# Patient Record
Sex: MALE | Race: WHITE
[De-identification: names, ages, dates, MRNs, and addresses within clinical notes are randomized per-mention and may not be internally consistent; named-entity substitution may affect disease eponyms.]

---

## 2017-05-05 ENCOUNTER — HOSPITAL ENCOUNTER (OUTPATIENT)
Dept: HOSPITAL 45 - C.MRIBC | Age: 57
Discharge: HOME | End: 2017-05-05
Attending: PSYCHIATRY & NEUROLOGY
Payer: COMMERCIAL

## 2017-05-05 DIAGNOSIS — Z87.898: ICD-10-CM

## 2017-05-05 DIAGNOSIS — R41.3: Primary | ICD-10-CM

## 2017-05-05 DIAGNOSIS — R90.89: ICD-10-CM

## 2017-05-05 NOTE — DIAGNOSTIC IMAGING REPORT
Brain MRI WITHOUT CONTRAST



HISTORY: Mental status change  MEMORY Loss, hx OF HEADACHES



TECHNIQUE: Multiplanar multisequence MRI of the brain was performed without the

use of contrast.



COMPARISON STUDY:  None.



FINDINGS: There are no areas of restricted diffusion to suggest acute

infarction. The midline structures are intact. The paranasal sinuses are clear.

The mastoid air cells are clear. The ventricles and sulci are within normal

limits for age. There is no mass, hematoma, midline shift. The major vascular

flow-voids at the skull base are well maintained. Several small foci of

increased signal are present within the periventricular deep white matter

regions.



IMPRESSION:  

Several small foci of increased signal within the periventricular deep white

matter regions. This is consistent with that of mild chronic small vessel change

considered unremarkable for age. No acute ischemic insult. 







Electronically signed by:  Good [place]iz M.D.

5/5/2017 11:41 AM



Dictated Date/Time:  5/5/2017 11:23 AM

## 2018-01-10 ENCOUNTER — HOSPITAL ENCOUNTER (OUTPATIENT)
Dept: HOSPITAL 45 - C.LAB1850 | Age: 58
Discharge: HOME | End: 2018-01-10
Attending: INTERNAL MEDICINE
Payer: COMMERCIAL

## 2018-01-10 DIAGNOSIS — E11.42: ICD-10-CM

## 2018-01-10 DIAGNOSIS — K76.0: ICD-10-CM

## 2018-01-10 DIAGNOSIS — Z12.5: ICD-10-CM

## 2018-01-10 DIAGNOSIS — Z00.00: Primary | ICD-10-CM

## 2018-01-10 DIAGNOSIS — E78.5: ICD-10-CM

## 2018-01-10 LAB
ALBUMIN SERPL-MCNC: 4 GM/DL (ref 3.4–5)
ALP SERPL-CCNC: 53 U/L (ref 45–117)
ALT SERPL-CCNC: 36 U/L (ref 12–78)
AST SERPL-CCNC: 16 U/L (ref 15–37)
BASOPHILS # BLD: 0.02 K/UL (ref 0–0.2)
BASOPHILS NFR BLD: 0.3 %
BUN SERPL-MCNC: 16 MG/DL (ref 7–18)
CALCIUM SERPL-MCNC: 8.4 MG/DL (ref 8.5–10.1)
CO2 SERPL-SCNC: 27 MMOL/L (ref 21–32)
CREAT SERPL-MCNC: 1.06 MG/DL (ref 0.6–1.4)
EOS ABS #: 0.13 K/UL (ref 0–0.5)
EOSINOPHIL NFR BLD AUTO: 154 K/UL (ref 130–400)
GLUCOSE SERPL-MCNC: 99 MG/DL (ref 70–99)
HCT VFR BLD CALC: 42.6 % (ref 42–52)
HGB BLD-MCNC: 14.9 G/DL (ref 14–18)
IG#: 0.03 K/UL (ref 0–0.02)
IMM GRANULOCYTES NFR BLD AUTO: 34.4 %
KETONES UR QL STRIP: 103 MG/DL
LYMPHOCYTES # BLD: 2.62 K/UL (ref 1.2–3.4)
MCH RBC QN AUTO: 32.1 PG (ref 25–34)
MCHC RBC AUTO-ENTMCNC: 35 G/DL (ref 32–36)
MCV RBC AUTO: 91.8 FL (ref 80–100)
MONO ABS #: 0.81 K/UL (ref 0.11–0.59)
MONOCYTES NFR BLD: 10.6 %
NEUT ABS #: 4 K/UL (ref 1.4–6.5)
NEUTROPHILS # BLD AUTO: 1.7 %
NEUTROPHILS NFR BLD AUTO: 52.6 %
PH UR: 206 MG/DL (ref 0–200)
PMV BLD AUTO: 9 FL (ref 7.4–10.4)
POTASSIUM SERPL-SCNC: 3.7 MMOL/L (ref 3.5–5.1)
PROT SERPL-MCNC: 7.4 GM/DL (ref 6.4–8.2)
RED CELL DISTRIBUTION WIDTH CV: 13 % (ref 11.5–14.5)
RED CELL DISTRIBUTION WIDTH SD: 42.9 FL (ref 36.4–46.3)
SODIUM SERPL-SCNC: 138 MMOL/L (ref 136–145)
WBC # BLD AUTO: 7.61 K/UL (ref 4.8–10.8)

## 2018-01-11 LAB — HBA1C MFR BLD: 5.8 % (ref 4.5–5.6)
